# Patient Record
Sex: FEMALE | Race: WHITE | NOT HISPANIC OR LATINO | Employment: FULL TIME | URBAN - METROPOLITAN AREA
[De-identification: names, ages, dates, MRNs, and addresses within clinical notes are randomized per-mention and may not be internally consistent; named-entity substitution may affect disease eponyms.]

---

## 2022-08-29 ENCOUNTER — OFFICE VISIT (OUTPATIENT)
Dept: OBSTETRICS AND GYNECOLOGY | Age: 22
End: 2022-08-29

## 2022-08-29 VITALS
HEIGHT: 71 IN | WEIGHT: 289.6 LBS | BODY MASS INDEX: 40.54 KG/M2 | SYSTOLIC BLOOD PRESSURE: 118 MMHG | DIASTOLIC BLOOD PRESSURE: 72 MMHG

## 2022-08-29 DIAGNOSIS — N92.6 IRREGULAR PERIODS: ICD-10-CM

## 2022-08-29 DIAGNOSIS — E66.01 MORBID OBESITY WITH BMI OF 40.0-44.9, ADULT: ICD-10-CM

## 2022-08-29 DIAGNOSIS — Z12.4 SCREENING FOR MALIGNANT NEOPLASM OF CERVIX: ICD-10-CM

## 2022-08-29 DIAGNOSIS — Z01.411 ENCOUNTER FOR GYNECOLOGICAL EXAMINATION WITH ABNORMAL FINDING: Primary | ICD-10-CM

## 2022-08-29 DIAGNOSIS — Z30.09 COUNSELING FOR INITIATION OF BIRTH CONTROL METHOD: ICD-10-CM

## 2022-08-29 PROCEDURE — 99395 PREV VISIT EST AGE 18-39: CPT | Performed by: NURSE PRACTITIONER

## 2022-08-29 PROCEDURE — 99212 OFFICE O/P EST SF 10 MIN: CPT | Performed by: NURSE PRACTITIONER

## 2022-08-29 RX ORDER — NORETHINDRONE ACETATE AND ETHINYL ESTRADIOL 1; .02 MG/1; MG/1
1 TABLET ORAL DAILY
Qty: 84 TABLET | Refills: 0 | Status: SHIPPED | OUTPATIENT
Start: 2022-08-29 | End: 2022-11-29

## 2022-08-29 NOTE — PROGRESS NOTES
[facility]   Obstetrics and Gynecology       2022    Patient: Dione Rowland          MR#:[id number]    History of Present Illness    Chief Complaint   Patient presents with   • Gynecologic Exam     New gyn, annual exam, first pap today, pt c/o irregular periods and wants to maybe try bc to regulate periods       22 y.o. female  who presents for annual exam.  She is sexually active with 1 male partner, they use condoms for contraception.  Menarche: Age 14.  She usually has a period every month but there have been several episodes where she has skipped 1 to 2 months.  She denies any vaginal or breast complaints.  Studies reviewed:    Patient's last menstrual period was 2022 (exact date).  Obstetric History:  OB History        0    Para   0    Term   0       0    AB   0    Living   0       SAB   0    IAB   0    Ectopic   0    Molar   0    Multiple   0    Live Births   0               Menstrual History:     Patient's last menstrual period was 2022 (exact date).       Sexual History:       ________________________________________  Patient Active Problem List   Diagnosis   • Morbid obesity with BMI of 40.0-44.9, adult (HCC)   • Irregular periods     History reviewed. No pertinent past medical history.  History reviewed. No pertinent surgical history.  Social History     Tobacco Use   Smoking Status Passive Smoke Exposure - Never Smoker   Smokeless Tobacco Never Used     Family History   Problem Relation Age of Onset   • No Known Problems Mother    • No Known Problems Father      Prior to Admission medications    Medication Sig Start Date End Date Taking? Authorizing Provider   diclofenac (VOLTAREN) 75 MG EC tablet Take 1 tablet by mouth 2 (Two) Times a Day As Needed (ankle and foot pain). 19   Carol Okeefe, JARROD     ________________________________________    Current contraception: condoms  History of abnormal Pap smear: no  Family history of uterine or ovarian  "cancer: no  Family History of colon cancer/colon polyps: no  History of abnormal mammogram: no      The following portions of the patient's history were reviewed and updated as appropriate: allergies, current medications, past family history, past medical history, past social history, past surgical history, and problem list.    Review of Systems   Constitutional: Negative for activity change, appetite change, chills, fatigue and fever.   Respiratory: Negative for cough and shortness of breath.    Cardiovascular: Negative for chest pain.   Gastrointestinal: Negative for constipation, diarrhea, nausea and vomiting.   Genitourinary: Positive for menstrual problem. Negative for dysuria, flank pain, genital sores, hematuria and vaginal bleeding.            Objective   Physical Exam  Constitutional:       Appearance: Normal appearance. She is obese.   HENT:      Head: Normocephalic and atraumatic.      Nose: Nose normal.      Mouth/Throat:      Mouth: Mucous membranes are moist.   Pulmonary:      Effort: Pulmonary effort is normal.   Chest:   Breasts:      Right: Normal. No mass or nipple discharge.      Left: No mass or nipple discharge.       Abdominal:      General: Abdomen is flat.      Palpations: Abdomen is soft.   Genitourinary:     General: Normal vulva.      Exam position: Lithotomy position.      Labia:         Right: No rash, tenderness or lesion.         Left: No rash, tenderness or lesion.       Vagina: Normal. No signs of injury.      Cervix: Normal.      Uterus: Normal.       Adnexa: Right adnexa normal and left adnexa normal.      Rectum: Normal.   Musculoskeletal:         General: Normal range of motion.      Cervical back: Normal range of motion.   Skin:     General: Skin is warm and dry.   Neurological:      Mental Status: She is alert.   Psychiatric:         Mood and Affect: Mood normal.         Behavior: Behavior normal.         /72   Ht 180.3 cm (71\")   Wt 131 kg (289 lb 9.6 oz)   LMP " "08/06/2022 (Exact Date)   Breastfeeding No   BMI 40.39 kg/m²    BP Readings from Last 3 Encounters:   08/29/22 118/72   11/07/18 (!) 116/84 (66 %, Z = 0.41 /  97 %, Z = 1.88)*     *BP percentiles are based on the 2017 AAP Clinical Practice Guideline for girls      Wt Readings from Last 3 Encounters:   08/29/22 131 kg (289 lb 9.6 oz)   11/07/18 127 kg (280 lb) (>99 %, Z= 2.62)*     * Growth percentiles are based on [facility] (Girls, 2-20 Years) data.        BMI: Estimated body mass index is 40.39 kg/m² as calculated from the following:    Height as of this encounter: 180.3 cm (71\").    Weight as of this encounter: 131 kg (289 lb 9.6 oz).    Counseling:  --Nutrition: Stressed importance of moderation and caloric balance, stressed fresh fruit and vegetables  --Exercise: Stressed the importance of regular exercise. 3-5 times weekly   - Discussed screening mammogram recommendations.   --Discussed benefits of screening colonoscopy- age 45 unless FH  --Discussed pap smear screening recommendations     Start ocp with next menses. Use back up x 1 month. The use of the oral contraceptive has been fully discussed with the patient. This includes the proper method to initiate (i.e. Sunday start after next normal menstrual onset) and continue the pills, the need for regular compliance to ensure adequate contraceptive effect, the physiology which make the pill effective, the instructions for what to do in event of a missed pill, and warnings about anticipated minor side effects such as breakthrough spotting, nausea, breast tenderness, weight changes, acne, headaches, etc. She has been told of the more serious potential side effects such as MI, stroke, and deep vein thrombosis, all of which are very unlikely. She has been asked to report any signs of such serious problems immediately. She should back up the pill with a condom during any cycle in which antibiotics are prescribed, and during the first cycle as well. The need for " additional protection, such as a condom, to prevent exposure to sexually transmitted diseases has also been discussed- the patient has been clearly reminded that OCP's cannot protect her against diseases such as HIV and others. She understands and wishes to take the medication as prescribed.            Assessment:  Diagnoses and all orders for this visit:    1. Encounter for gynecological examination with abnormal finding (Primary)    2. Screening for malignant neoplasm of cervix  -     IGP,CtNgTv,rfx Aptima HPV ASCU    3. Irregular periods    4. Counseling for initiation of birth control method  -     norethindrone-ethinyl estradiol (Loestrin 1/20, 21,) 1-20 MG-MCG per tablet; Take 1 tablet by mouth Daily for 90 days.  Dispense: 84 tablet; Refill: 0    5. Morbid obesity with BMI of 40.0-44.9, adult (HCC)        Plan:  Return in about 3 months (around 11/29/2022) for Recheck.    Amara Bryant, JARROD  8/29/2022 13:12 EDT

## 2022-09-03 LAB
C TRACH RRNA CVX QL NAA+PROBE: NEGATIVE
CONV .: NORMAL
CYTOLOGIST CVX/VAG CYTO: NORMAL
CYTOLOGY CVX/VAG DOC CYTO: NORMAL
CYTOLOGY CVX/VAG DOC THIN PREP: NORMAL
DX ICD CODE: NORMAL
HIV 1 & 2 AB SER-IMP: NORMAL
N GONORRHOEA RRNA CVX QL NAA+PROBE: NEGATIVE
OTHER STN SPEC: NORMAL
STAT OF ADQ CVX/VAG CYTO-IMP: NORMAL
T VAGINALIS RRNA SPEC QL NAA+PROBE: NEGATIVE

## 2022-11-29 DIAGNOSIS — Z30.09 COUNSELING FOR INITIATION OF BIRTH CONTROL METHOD: ICD-10-CM

## 2022-11-29 RX ORDER — NORETHINDRONE ACETATE AND ETHINYL ESTRADIOL 1; 20 MG/1; UG/1
TABLET ORAL
Qty: 21 TABLET | Refills: 11 | Status: SHIPPED | OUTPATIENT
Start: 2022-11-29

## 2023-02-01 ENCOUNTER — TELEPHONE (OUTPATIENT)
Dept: OBSTETRICS AND GYNECOLOGY | Age: 23
End: 2023-02-01
Payer: COMMERCIAL

## 2023-02-01 RX ORDER — FLUCONAZOLE 150 MG/1
150 TABLET ORAL ONCE
Qty: 1 TABLET | Refills: 0 | Status: SHIPPED | OUTPATIENT
Start: 2023-02-01 | End: 2023-02-01

## 2024-03-19 ENCOUNTER — OFFICE VISIT (OUTPATIENT)
Dept: OBSTETRICS AND GYNECOLOGY | Age: 24
End: 2024-03-19
Payer: COMMERCIAL

## 2024-03-19 VITALS
HEIGHT: 71 IN | DIASTOLIC BLOOD PRESSURE: 68 MMHG | SYSTOLIC BLOOD PRESSURE: 126 MMHG | WEIGHT: 256 LBS | BODY MASS INDEX: 35.84 KG/M2

## 2024-03-19 DIAGNOSIS — Z11.3 SCREENING FOR STDS (SEXUALLY TRANSMITTED DISEASES): ICD-10-CM

## 2024-03-19 DIAGNOSIS — Z01.419 WELL WOMAN EXAM WITH ROUTINE GYNECOLOGICAL EXAM: Primary | ICD-10-CM

## 2024-03-19 DIAGNOSIS — Z30.41 ENCOUNTER FOR BIRTH CONTROL PILLS MAINTENANCE: ICD-10-CM

## 2024-03-19 RX ORDER — NORGESTIMATE AND ETHINYL ESTRADIOL 0.25-0.035
1 KIT ORAL DAILY
Qty: 28 TABLET | Refills: 12 | Status: SHIPPED | OUTPATIENT
Start: 2024-03-19 | End: 2025-03-19

## 2024-03-19 RX ORDER — NORETHINDRONE ACETATE AND ETHINYL ESTRADIOL .02; 1 MG/1; MG/1
1 TABLET ORAL DAILY
Qty: 21 TABLET | Refills: 11 | Status: SHIPPED | OUTPATIENT
Start: 2024-03-19 | End: 2024-03-19

## 2024-03-19 NOTE — PROGRESS NOTES
Subjective     History of Present Illness    Chief Complaint   Patient presents with    Gynecologic Exam     AE Today, Last pap 2022 (-), pt is wanting STD testing, c/o increased discharge        Dione Rowland is a 23 y.o. female who presents for annual exam.  Menses are regular every 28-30 days, lasting 4-7 days, dysmenorrhea none     C/o vaginal discharge and vaginal itching x 1 month. OTC monistat has not given relief. Diflucan in past has not helped. No vaginal odor, urinary symptoms, or fevers.  Requests STD testing by swab and lab.  Would like to start back on OCPs.   PAP utd.     Obstetric History:  OB History          0    Para   0    Term   0       0    AB   0    Living   0         SAB   0    IAB   0    Ectopic   0    Molar   0    Multiple   0    Live Births   0               Menstrual History:     Patient's last menstrual period was 2024 (approximate).           Current contraception: none  History of abnormal Pap smear: no  Received Gardasil immunization: yes  Perform regular self breast exam: yes -    Family history of uterine or ovarian cancer: yes - mother, dx 38 y/o  Family History of colon cancer: no  Family history of breast cancer: no    PAP: up to date - normal, 2022  Mammogram: not indicated.  Colonoscopy: not indicated.  DEXA: not indicated.    Exercise: moderately active  Calcium/Vitamin D: adequate intake    The following portions of the patient's history were reviewed and updated as appropriate: allergies, current medications, past family history, past medical history, past social history, past surgical history, and problem list.    Review of Systems  A comprehensive review of systems was negative except for: Genitourinary: positive for vaginal discharge, vaginal itching       Objective   Physical Exam  Genitourinary:         Comments: Cyst on the vulva, consistent with HS. Cysts are erythematous and hard.  No surrounding swelling, open lesions, or warmth.  Patient  "reports that no pain or tenderness on exam. Cyst are not herpetic lesions or infected hair follicles.  Patient reports these cyst reappear often on her vulva and armpits.         /68   Ht 180.3 cm (71\")   Wt 116 kg (256 lb)   LMP 03/08/2024 (Approximate)   BMI 35.70 kg/m²      General: alert, appears stated age, cooperative, and no distress   Heart: regular rate and rhythm, S1, S2 normal, no murmur, click, rub or gallop   Lungs: clear to auscultation bilaterally   Abdomen: soft, non-tender, without masses or organomegaly   Breast: inspection negative, no nipple discharge or bleeding, no masses or nodularity palpable   External genitalia/Vulva: Cysts on vulva consistent with HS, External genitalia including bartholin's glands, Urethra, [place]'s gland and urethra meatus are normal, and Bladder appears normal without significant prolapse    Vagina: normal mucosa, thin white discharge   Cervix: no lesions   Uterus: normal size and non-tender   Adnexa: normal adnexa and no mass, fullness, tenderness   Neurologic: Alert and Oriented x3   Psychiatric: Normal affect, judgement, and mood       Assessment & Plan   Diagnoses and all orders for this visit:    1. Well woman exam with routine gynecological exam (Primary)    2. Screening for STDs (sexually transmitted diseases)  -     NuSwab VG+ - Swab, Vagina  -     RPR, Rfx Qn RPR / Confirm TP  -     Hepatitis B Surface Antigen  -     Hepatitis C Antibody  -     HIV-1 / O / 2 Ag / Antibody 4th Generation    3. Encounter for birth control pills maintenance  -     Discontinue: norethindrone-ethinyl estradiol (Junel 1/20) 1-20 MG-MCG per tablet; Take 1 tablet by mouth Daily.  Dispense: 21 tablet; Refill: 11  -     norgestimate-ethinyl estradiol (ORTHO-CYCLEN) 0.25-35 MG-MCG per tablet; Take 1 tablet by mouth Daily.  Dispense: 28 tablet; Refill: 12          All questions answered.  Will call patient with results and treat accordingly.   Breast self exam technique reviewed " and patient encouraged to perform self-exam monthly.  Physical activity and regular exercise encouraged.  Discussed healthy lifestyle modifications.  OCP refills sent   Nuswab VG+ and STD lab panel done today, will call patient with results and treat accordingly.   Discussed findings of cyst on vulva with patient.  She describes that these reappear often on her vagina and armpits, are not painful.  Cyst or consistent with Hidradenitis Suppurativa.  Patient education packet on HS from [employer] given to pt. since the reoccurring cyst are not painful, treatment is likely not indicated.  Discussed with the patient that dermatologist usually manage at bedtime, and to let me know if she would like a referral.       Return in 1 year (on 3/19/2025) for Annual exam.

## 2024-03-20 LAB
HBV SURFACE AG SERPL QL IA: NEGATIVE
HCV IGG SERPL QL IA: NON REACTIVE
HIV 1+2 AB+HIV1 P24 AG SERPL QL IA: NON REACTIVE
RPR SER QL: NON REACTIVE

## 2024-03-21 DIAGNOSIS — A59.9 TRICHOMONAS VAGINALIS INFECTION: Primary | ICD-10-CM

## 2024-03-21 LAB
A VAGINAE DNA VAG QL NAA+PROBE: ABNORMAL SCORE
BVAB2 DNA VAG QL NAA+PROBE: ABNORMAL SCORE
C ALBICANS DNA VAG QL NAA+PROBE: NEGATIVE
C GLABRATA DNA VAG QL NAA+PROBE: NEGATIVE
C TRACH DNA VAG QL NAA+PROBE: NEGATIVE
MEGA1 DNA VAG QL NAA+PROBE: ABNORMAL SCORE
N GONORRHOEA DNA VAG QL NAA+PROBE: NEGATIVE
T VAGINALIS DNA VAG QL NAA+PROBE: POSITIVE

## 2024-03-21 RX ORDER — METRONIDAZOLE 500 MG/1
500 TABLET ORAL 2 TIMES DAILY
Qty: 14 TABLET | Refills: 0 | Status: SHIPPED | OUTPATIENT
Start: 2024-03-21 | End: 2024-03-28

## 2024-04-17 ENCOUNTER — OFFICE VISIT (OUTPATIENT)
Dept: OBSTETRICS AND GYNECOLOGY | Age: 24
End: 2024-04-17
Payer: COMMERCIAL

## 2024-04-17 VITALS
SYSTOLIC BLOOD PRESSURE: 122 MMHG | WEIGHT: 249 LBS | HEIGHT: 71 IN | BODY MASS INDEX: 34.86 KG/M2 | DIASTOLIC BLOOD PRESSURE: 74 MMHG

## 2024-04-17 DIAGNOSIS — A59.9 TRICHOMONAS VAGINALIS INFECTION: Primary | ICD-10-CM

## 2024-04-17 DIAGNOSIS — N92.6 IRREGULAR MENSES: ICD-10-CM

## 2024-04-17 NOTE — PROGRESS NOTES
"Subjective     History of Present Illness  Dione Rowland is a 24 y.o.  female is being seen today for   Chief Complaint   Patient presents with    Gynecologic Exam     Follow up YUMIKO      Patient here today for test of cure for trichomonas. Finished antibiotics, flagyl.  No vaginal discharge, vaginal itching, vaginal odor, or urinary symptoms.  Patient would like to discuss testing for PCOS.  States her menses have historically been irregular, will go a few months at a time without.  Menses last 3 to 4 days.  Menses heavy, feeling a super plus tampon 2-3 times a day. She was prescribed an OCP at her last visit, but has not started this yet.  Reports history of acne, facial hair, weight gain.    The following portions of the patient's history were reviewed and updated as appropriate: allergies, current medications, past family history, past medical history, past social history, past surgical history and problem list.    /74   Ht 180.3 cm (71\")   Wt 113 kg (249 lb)   LMP 2024 (Approximate)   BMI 34.73 kg/m²         Review of Systems   Constitutional: Negative.    HENT: Negative.     Eyes: Negative.    Respiratory: Negative.     Cardiovascular: Negative.    Gastrointestinal: Negative.    Endocrine: Negative.    Genitourinary:  Positive for menstrual problem (irregular).   Musculoskeletal: Negative.    Skin: Negative.    Allergic/Immunologic: Negative.    Neurological: Negative.    Hematological: Negative.    Psychiatric/Behavioral: Negative.         Objective   Physical Exam  Constitutional:       General: She is not in acute distress.  Cardiovascular:      Rate and Rhythm: Normal rate and regular rhythm.      Pulses: Normal pulses.      Heart sounds: Normal heart sounds.   Pulmonary:      Effort: Pulmonary effort is normal.      Breath sounds: Normal breath sounds.   Genitourinary:     Exam position: Lithotomy position.      Labia:         Right: No rash, tenderness or lesion.         Left: No " rash, tenderness or lesion.       Vagina: Vaginal discharge (white) present.      Cervix: Normal.   Neurological:      General: No focal deficit present.      Mental Status: She is alert and oriented to person, place, and time.   Psychiatric:         Mood and Affect: Mood normal.         Behavior: Behavior normal.         Thought Content: Thought content normal.         Judgment: Judgment normal.           Assessment & Plan   Diagnoses and all orders for this visit:    1. Trichomonas vaginalis infection (Primary)  -     NuSwab VG+ - Swab, Vagina    2. Irregular menses  -     DHEA-Sulfate  -     17-Hydroxyprogesterone  -     Prolactin  -     Lipid Panel  -     Hemoglobin A1c  -     Testosterone Free Direct  -     TSH+Free T4  -     FSH & LH  -     Estradiol    -NuSwab VG+ done today, will call patient with results and treat accordingly.  Advised condom use for STD prevention.  -Labs completed today to rule in/out causes for irregular menses, will call patient with results.  Discussed pathology and management for PCOS with patient, which includes birth control.  Patient states she intends to start OCP this Sunday.     All questions answered. Patient verbalizes understanding and is agreeable to plan.  Return if symptoms worsen or fail to improve.

## 2024-04-19 LAB
A VAGINAE DNA VAG QL NAA+PROBE: ABNORMAL SCORE
BVAB2 DNA VAG QL NAA+PROBE: ABNORMAL SCORE
C ALBICANS DNA VAG QL NAA+PROBE: NEGATIVE
C GLABRATA DNA VAG QL NAA+PROBE: NEGATIVE
C TRACH DNA VAG QL NAA+PROBE: NEGATIVE
MEGA1 DNA VAG QL NAA+PROBE: ABNORMAL SCORE
N GONORRHOEA DNA VAG QL NAA+PROBE: NEGATIVE
T VAGINALIS DNA VAG QL NAA+PROBE: NEGATIVE

## 2024-04-22 ENCOUNTER — E-VISIT (OUTPATIENT)
Dept: ADMINISTRATIVE | Facility: OTHER | Age: 24
End: 2024-04-22
Payer: COMMERCIAL

## 2024-04-22 ENCOUNTER — TELEPHONE (OUTPATIENT)
Dept: OBSTETRICS AND GYNECOLOGY | Age: 24
End: 2024-04-22
Payer: COMMERCIAL

## 2024-04-22 NOTE — E-VISIT ESCALATED
Chief Complaint: Yeast infection   Patient was shown the following escalation message:   Some conditions need a visit with a healthcare provider   Frothy or foamy vaginal discharge is unusual with common vaginal infections, such as a yeast infection or bacterial vaginosis. Because this suggests a different health problem, you should speak with a provider to get care.   ----------   Patient Interview Transcript:   Which of these symptoms are bothering you? Select all that apply.    Itching around my vagina    Vaginal discharge that looks different than usual   Not selected:    Itching in my vagina    Burning around my vagina    Burning in my vagina    Fishy-smelling vaginal discharge    Pain during sex    Burning with urination    None of the above   How long have you had these symptoms? Select one.    1 to 3 days   Not selected:    Less than 24 hours    4 to 7 days    More than 7 days   How would you describe your vaginal discharge? Select one.    Frothy or foamy   Not selected:    Thick and clumpy, like cottage cheese    Thin    Other (specify)   ----------   Medical history   Medical history data does not currently exist for this patient.

## 2024-04-24 DIAGNOSIS — N76.0 BV (BACTERIAL VAGINOSIS): Primary | ICD-10-CM

## 2024-04-24 DIAGNOSIS — B96.89 BV (BACTERIAL VAGINOSIS): Primary | ICD-10-CM

## 2024-04-24 LAB
17OHP SERPL-MCNC: 41 NG/DL
CHOLEST SERPL-MCNC: 189 MG/DL (ref 100–199)
DHEA-S SERPL-MCNC: 312 UG/DL (ref 110–431.7)
ESTRADIOL SERPL-MCNC: 51.5 PG/ML
FSH SERPL-ACNC: 6.2 MIU/ML
HBA1C MFR BLD: 5.4 % (ref 4.8–5.6)
HDLC SERPL-MCNC: 47 MG/DL
LDLC SERPL CALC-MCNC: 123 MG/DL (ref 0–99)
LH SERPL-ACNC: 7.5 MIU/ML
PROLACTIN SERPL-MCNC: 31.3 NG/ML (ref 4.8–33.4)
T4 FREE SERPL-MCNC: 1.17 NG/DL (ref 0.82–1.77)
TESTOST FREE SERPL-MCNC: 2.5 PG/ML (ref 0–4.2)
TRIGL SERPL-MCNC: 102 MG/DL (ref 0–149)
TSH SERPL DL<=0.005 MIU/L-ACNC: 1.76 UIU/ML (ref 0.45–4.5)
VLDLC SERPL CALC-MCNC: 19 MG/DL (ref 5–40)

## 2024-04-24 RX ORDER — METRONIDAZOLE 500 MG/1
500 TABLET ORAL 2 TIMES DAILY
Qty: 14 TABLET | Refills: 0 | Status: SHIPPED | OUTPATIENT
Start: 2024-04-24 | End: 2024-05-01

## 2024-09-05 ENCOUNTER — TELEPHONE (OUTPATIENT)
Dept: OBSTETRICS AND GYNECOLOGY | Age: 24
End: 2024-09-05
Payer: COMMERCIAL

## 2024-09-05 RX ORDER — NORETHINDRONE ACETATE AND ETHINYL ESTRADIOL .02; 1 MG/1; MG/1
1 TABLET ORAL DAILY
Qty: 21 TABLET | Refills: 12 | Status: SHIPPED | OUTPATIENT
Start: 2024-09-05 | End: 2025-09-05

## 2024-09-05 NOTE — TELEPHONE ENCOUNTER
Called and spoke with patient.  Patient had called wanting to switch her birth control pill, as she has been irregularly bleeding with that.  She was previously on Loestrin 1/20 and did not have irregular bleeding with this.  Called in the Loestrin 1/20 to pharmacy, instructed patient to start this the Sunday after her next menses or to take a pregnancy test before starting.  Informed patient to call if irregular bleeding continues so she can make an appointment for a swab and further investigation for irregular bleeding.  Patient verbalized understanding and is agreeable to plan.     Berta Mabry PA-C

## 2024-09-05 NOTE — TELEPHONE ENCOUNTER
Pt having a continuos period on Orto cyclen and asking to change pills, pt denies missing pills.Pt stopped pills on Sunday